# Patient Record
Sex: MALE | Race: OTHER | HISPANIC OR LATINO
[De-identification: names, ages, dates, MRNs, and addresses within clinical notes are randomized per-mention and may not be internally consistent; named-entity substitution may affect disease eponyms.]

---

## 2019-10-10 ENCOUNTER — APPOINTMENT (OUTPATIENT)
Dept: INTERNAL MEDICINE | Facility: CLINIC | Age: 69
End: 2019-10-10

## 2020-08-20 ENCOUNTER — APPOINTMENT (OUTPATIENT)
Dept: INTERNAL MEDICINE | Facility: CLINIC | Age: 70
End: 2020-08-20

## 2020-12-24 ENCOUNTER — TRANSCRIPTION ENCOUNTER (OUTPATIENT)
Age: 70
End: 2020-12-24

## 2021-02-26 ENCOUNTER — APPOINTMENT (OUTPATIENT)
Dept: INTERNAL MEDICINE | Facility: CLINIC | Age: 71
End: 2021-02-26
Payer: MEDICARE

## 2021-02-26 VITALS
SYSTOLIC BLOOD PRESSURE: 130 MMHG | OXYGEN SATURATION: 97 % | HEART RATE: 74 BPM | DIASTOLIC BLOOD PRESSURE: 70 MMHG | BODY MASS INDEX: 22.65 KG/M2 | HEIGHT: 67.5 IN | WEIGHT: 146 LBS

## 2021-02-26 DIAGNOSIS — Z78.9 OTHER SPECIFIED HEALTH STATUS: ICD-10-CM

## 2021-02-26 DIAGNOSIS — Z86.018 PERSONAL HISTORY OF OTHER BENIGN NEOPLASM: ICD-10-CM

## 2021-02-26 DIAGNOSIS — Z84.89 FAMILY HISTORY OF OTHER SPECIFIED CONDITIONS: ICD-10-CM

## 2021-02-26 DIAGNOSIS — Z80.3 FAMILY HISTORY OF MALIGNANT NEOPLASM OF BREAST: ICD-10-CM

## 2021-02-26 DIAGNOSIS — Z85.038 PERSONAL HISTORY OF OTHER MALIGNANT NEOPLASM OF LARGE INTESTINE: ICD-10-CM

## 2021-02-26 DIAGNOSIS — M79.603 PAIN IN ARM, UNSPECIFIED: ICD-10-CM

## 2021-02-26 DIAGNOSIS — U07.1 COVID-19: ICD-10-CM

## 2021-02-26 DIAGNOSIS — Z92.3 PERSONAL HISTORY OF IRRADIATION: ICD-10-CM

## 2021-02-26 DIAGNOSIS — Z92.21 PERSONAL HISTORY OF ANTINEOPLASTIC CHEMOTHERAPY: ICD-10-CM

## 2021-02-26 PROCEDURE — G0442 ANNUAL ALCOHOL SCREEN 15 MIN: CPT | Mod: 59

## 2021-02-26 PROCEDURE — G0439: CPT | Mod: CS

## 2021-02-26 PROCEDURE — G0444 DEPRESSION SCREEN ANNUAL: CPT | Mod: 59

## 2021-02-26 NOTE — PHYSICAL EXAM
[No Lymphadenopathy] : no lymphadenopathy [Supple] : supple [No Varicosities] : no varicosities [No Edema] : there was no peripheral edema [Soft] : abdomen soft [Non Tender] : non-tender [Non-distended] : non-distended [Normal Supraclavicular Nodes] : no supraclavicular lymphadenopathy [Coordination Grossly Intact] : coordination grossly intact [No Focal Deficits] : no focal deficits [Normal Gait] : normal gait [Normal] : affect was normal and insight and judgment were intact [de-identified] : poor dentition, evidence of grinding teeth [de-identified] : declined [de-identified] : b/l knee effusions. ulnar deviation of b/l hands [de-identified] : erythematous, smooth non scaly plaques on legs. dactylitis of both hands

## 2021-02-26 NOTE — ASSESSMENT
[FreeTextEntry1] : 70M with history of COVID infection, history of tonsil cancer, tubulovillous adenoma on colonoscopy presents for new patient appointment. \par \par 1. HCM\par -vaccinations: influenza , shingrix, pneumovax, tdap are due but since patient finished COVID dose Tuesday will delay vaccination for now; need to wait 2 weeks. they prefer to discuss next visit\par -colon cancer screening - last exam in 2019 was normal; following at [facility] [place] will bring records next visit\par -dexa - referral given today\par -hcv/hiv testing offered\par -pt would like PSA checked.  Patient wanted prostate checked-explained that in primary care clinic we can do prostate exam and PSA.  Patient and his daughter asked about accuracy of both–explained that prostate exam is not perfect as only 1 part of the prostate can be examined so any masses that might be located on the other side would be palpated.  Similarly PSA is also not a perfect test elevations can be present when there is prostate cancer present or possibly if there is just BPH.  With this information patient elected to just have PSA checked\par \par 2. Colon tubulovillous adenoma\par -GI follow up - referral given in event they need for insurance purposes\par \par 3. History of tonsil cancer\par -cancer free since 2018, following at [facility] [place]\par \par 4. Ulnar deviation; psoriatic lesions?\par -rheumatology referral\par -non scaly plaques not exactly c/w psoriasis\par -fingers thickened/dactylitis with ulnar deviation\par \par 5.  Advanced care planning\par -Patient has not had a discussion about what he would like his care to look like should his heart stop or should he be unable to breathe but they are going to have this discussion\par -Patient would like his daughter to be his healthcare proxy\par -Provided with [facility] healthcare proxy forms which they will bring to next appointment\par \par RTC 4 months\par

## 2021-02-26 NOTE — HISTORY OF PRESENT ILLNESS
Psych/Behavioral [Family Member] : family member [FreeTextEntry1] : Visit to establish care with new primary care doctor\par  [de-identified] : 70M with history of COVID infection, history of tonsil cancer, tubulovillous adenoma on colonoscopy presents for new patient appointment. He is accompanied by his daughter, Barbara. Patient had PMD many years ago and in 2012 he had colon cancer. Then he had throat cancer. They have been to [facility] [place] [facility] and he needs PMD. His daughter is concerned about a numbness in his right arm, it falls asleep and feels electric pains in it. Mostly after he had vaccine he felt it more. They are referring to COVID vaccine. He has arthritis and is undergoing workup for rheumatoid arthritis. They also want prostate exam. Cancer free since 2018. Colon cancer was treated with partial colectomy then had more polyps then full colectomy. For throat cancer had chemo and radiation. He also has knee pain that limits his movement. \par \par HSS knee replacement 3/31/2021. PT planned for after \par \par Arm pain - started 1 month ago. The vaccine made it worse. He is constantly stretching the pain and when he feels it moves his arm and it goes away. No neck pain. Does not have the pain now.\par \par Prostate concerns - just has been a long time since evaluation. Does not have a strong stream anymore. \par \par PMH: colon cancer, head and neck cancer\par PSH: colectomy\par FH: mother had ICH. father passed away age 104, natural causes.  sister had breast cancer. \par SH: semi retired. lives with wife, DORON, 2 of brothers and sisters. \par Medications: no medications. vitamin c and d, zinc, asa daily\par Allergies: shrimp\par \par Reference #: [id number] - no prescriptions

## 2021-02-26 NOTE — REVIEW OF SYSTEMS
[Hearing Loss] : hearing loss [Hesitancy] : hesitancy [Skin Rash] : skin rash [Negative] : Heme/Lymph [Fever] : no fever [Chills] : no chills [Vision Problems] : no vision problems [Chest Pain] : no chest pain [Shortness Of Breath] : no shortness of breath [Abdominal Pain] : no abdominal pain [Dysuria] : no dysuria [Suicidal] : not suicidal [de-identified] : psoriasis

## 2021-02-26 NOTE — HEALTH RISK ASSESSMENT
[Good] : ~his/her~  mood as  good [1 or 2 (0 pts)] : 1 or 2 (0 points) [Never (0 pts)] : Never (0 points) [No] : In the past 12 months have you used drugs other than those required for medical reasons? No [0] : 2) Feeling down, depressed, or hopeless: Not at all (0) [With Family] : lives with family [Retired] : retired [] :  [Feels Safe at Home] : Feels safe at home [Fully functional (bathing, dressing, toileting, transferring, walking, feeding)] : Fully functional (bathing, dressing, toileting, transferring, walking, feeding) [Fully functional (using the telephone, shopping, preparing meals, housekeeping, doing laundry, using] : Fully functional and needs no help or supervision to perform IADLs (using the telephone, shopping, preparing meals, housekeeping, doing laundry, using transportation, managing medications and managing finances) [Smoke Detector] : smoke detector [Carbon Monoxide Detector] : carbon monoxide detector [Seat Belt] :  uses seat belt [Designated Healthcare Proxy] : Designated healthcare proxy [Name: ___] : Health Care Proxy's Name: [unfilled]  [Relationship: ___] : Relationship: [unfilled] [] : No [de-identified] : before pandemic was drinking 2-3 glasses wine daily.  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  [UNA1Rahqo] : 0 [Reports changes in hearing] : Reports no changes in hearing [Reports changes in vision] : Reports no changes in vision [Reports changes in dental health] : Reports no changes in dental health [Guns at Home] : no guns at home [Sunscreen] : does not use sunscreen [ColonoscopyDate] : 10/20/2017 [de-identified] : since radiation has hearing loss [de-identified] : educated on sunscreen use [AdvancecareDate] : 02/26/2021

## 2021-03-01 LAB
ALBUMIN SERPL ELPH-MCNC: 4.5 G/DL
ALP BLD-CCNC: 68 U/L
ALT SERPL-CCNC: 13 U/L
ANA SER IF-ACNC: NEGATIVE
ANION GAP SERPL CALC-SCNC: 17 MMOL/L
AST SERPL-CCNC: 17 U/L
BASOPHILS # BLD AUTO: 0.03 K/UL
BASOPHILS NFR BLD AUTO: 0.6 %
BILIRUB SERPL-MCNC: 0.2 MG/DL
BUN SERPL-MCNC: 25 MG/DL
CALCIUM SERPL-MCNC: 9.9 MG/DL
CCP AB SER IA-ACNC: <8 UNITS
CHLORIDE SERPL-SCNC: 99 MMOL/L
CHOLEST SERPL-MCNC: 220 MG/DL
CO2 SERPL-SCNC: 21 MMOL/L
CREAT SERPL-MCNC: 1.13 MG/DL
EOSINOPHIL # BLD AUTO: 0.18 K/UL
EOSINOPHIL NFR BLD AUTO: 3.5 %
ESTIMATED AVERAGE GLUCOSE: 105 MG/DL
GLUCOSE SERPL-MCNC: 90 MG/DL
HBA1C MFR BLD HPLC: 5.3 %
HCT VFR BLD CALC: 38.9 %
HCV AB SER QL: NONREACTIVE
HCV S/CO RATIO: 0.21 S/CO
HDLC SERPL-MCNC: 66 MG/DL
HGB BLD-MCNC: 12.9 G/DL
HIV1+2 AB SPEC QL IA.RAPID: NONREACTIVE
IMM GRANULOCYTES NFR BLD AUTO: 0.2 %
LDLC SERPL CALC-MCNC: 129 MG/DL
LYMPHOCYTES # BLD AUTO: 1.24 K/UL
LYMPHOCYTES NFR BLD AUTO: 24.1 %
MAN DIFF?: NORMAL
MCHC RBC-ENTMCNC: 32.3 PG
MCHC RBC-ENTMCNC: 33.2 GM/DL
MCV RBC AUTO: 97.5 FL
MONOCYTES # BLD AUTO: 0.8 K/UL
MONOCYTES NFR BLD AUTO: 15.6 %
NEUTROPHILS # BLD AUTO: 2.88 K/UL
NEUTROPHILS NFR BLD AUTO: 56 %
NONHDLC SERPL-MCNC: 154 MG/DL
PLATELET # BLD AUTO: 283 K/UL
POTASSIUM SERPL-SCNC: 4.5 MMOL/L
PROT SERPL-MCNC: 7.5 G/DL
PSA SERPL-MCNC: 0.92 NG/ML
RBC # BLD: 3.99 M/UL
RBC # FLD: 12.3 %
RF+CCP IGG SER-IMP: NEGATIVE
RHEUMATOID FACT SER QL: 14 IU/ML
SODIUM SERPL-SCNC: 137 MMOL/L
TRIGL SERPL-MCNC: 124 MG/DL
WBC # FLD AUTO: 5.14 K/UL

## 2021-04-01 ENCOUNTER — NON-APPOINTMENT (OUTPATIENT)
Age: 71
End: 2021-04-01

## 2021-04-23 ENCOUNTER — APPOINTMENT (OUTPATIENT)
Dept: INTERNAL MEDICINE | Facility: CLINIC | Age: 71
End: 2021-04-23

## 2021-06-03 ENCOUNTER — TRANSCRIPTION ENCOUNTER (OUTPATIENT)
Age: 71
End: 2021-06-03

## 2021-06-11 ENCOUNTER — NON-APPOINTMENT (OUTPATIENT)
Age: 71
End: 2021-06-11

## 2021-06-14 ENCOUNTER — NON-APPOINTMENT (OUTPATIENT)
Age: 71
End: 2021-06-14

## 2021-06-14 ENCOUNTER — APPOINTMENT (OUTPATIENT)
Dept: INTERNAL MEDICINE | Facility: CLINIC | Age: 71
End: 2021-06-14

## 2021-07-30 ENCOUNTER — NON-APPOINTMENT (OUTPATIENT)
Age: 71
End: 2021-07-30

## 2022-05-12 ENCOUNTER — NON-APPOINTMENT (OUTPATIENT)
Age: 72
End: 2022-05-12

## 2022-05-12 ENCOUNTER — APPOINTMENT (OUTPATIENT)
Dept: OPHTHALMOLOGY | Facility: CLINIC | Age: 72
End: 2022-05-12
Payer: MEDICARE

## 2022-05-12 PROCEDURE — 92250 FUNDUS PHOTOGRAPHY W/I&R: CPT

## 2022-05-12 PROCEDURE — 92004 COMPRE OPH EXAM NEW PT 1/>: CPT

## 2022-05-12 PROCEDURE — 92025 CPTRIZED CORNEAL TOPOGRAPHY: CPT

## 2022-06-22 ENCOUNTER — APPOINTMENT (OUTPATIENT)
Dept: OPHTHALMOLOGY | Facility: CLINIC | Age: 72
End: 2022-06-22
Payer: MEDICARE

## 2022-06-22 ENCOUNTER — NON-APPOINTMENT (OUTPATIENT)
Age: 72
End: 2022-06-22

## 2022-06-22 PROCEDURE — 92133 CPTRZD OPH DX IMG PST SGM ON: CPT

## 2022-06-22 PROCEDURE — 66821 AFTER CATARACT LASER SURGERY: CPT | Mod: RT

## 2022-07-28 ENCOUNTER — NON-APPOINTMENT (OUTPATIENT)
Age: 72
End: 2022-07-28

## 2022-07-28 ENCOUNTER — APPOINTMENT (OUTPATIENT)
Dept: OPHTHALMOLOGY | Facility: CLINIC | Age: 72
End: 2022-07-28

## 2022-07-28 PROCEDURE — 66821 AFTER CATARACT LASER SURGERY: CPT | Mod: 79,LT

## 2022-11-01 ENCOUNTER — APPOINTMENT (OUTPATIENT)
Dept: OPHTHALMOLOGY | Facility: CLINIC | Age: 72
End: 2022-11-01

## 2022-11-01 ENCOUNTER — NON-APPOINTMENT (OUTPATIENT)
Age: 72
End: 2022-11-01

## 2022-11-01 PROCEDURE — 92014 COMPRE OPH EXAM EST PT 1/>: CPT

## 2023-03-27 ENCOUNTER — LABORATORY RESULT (OUTPATIENT)
Age: 73
End: 2023-03-27

## 2023-03-27 ENCOUNTER — APPOINTMENT (OUTPATIENT)
Dept: INTERNAL MEDICINE | Facility: CLINIC | Age: 73
End: 2023-03-27
Payer: MEDICARE

## 2023-03-27 VITALS
WEIGHT: 150 LBS | DIASTOLIC BLOOD PRESSURE: 84 MMHG | OXYGEN SATURATION: 98 % | BODY MASS INDEX: 23.27 KG/M2 | HEIGHT: 67.5 IN | SYSTOLIC BLOOD PRESSURE: 132 MMHG | HEART RATE: 73 BPM

## 2023-03-27 DIAGNOSIS — R21 RASH AND OTHER NONSPECIFIC SKIN ERUPTION: ICD-10-CM

## 2023-03-27 DIAGNOSIS — H91.90 UNSPECIFIED HEARING LOSS, UNSPECIFIED EAR: ICD-10-CM

## 2023-03-27 DIAGNOSIS — R39.15 URGENCY OF URINATION: ICD-10-CM

## 2023-03-27 DIAGNOSIS — R41.3 OTHER AMNESIA: ICD-10-CM

## 2023-03-27 DIAGNOSIS — M20.61 ACQUIRED DEFORMITIES OF TOE(S), UNSPECIFIED, RIGHT FOOT: ICD-10-CM

## 2023-03-27 PROCEDURE — 99212 OFFICE O/P EST SF 10 MIN: CPT | Mod: 25

## 2023-03-27 PROCEDURE — G0439: CPT

## 2023-03-27 NOTE — HEALTH RISK ASSESSMENT
[With Family] : lives with family [Employed] : employed [Retired] : retired [] :  [Feels Safe at Home] : Feels safe at home [Fully functional (bathing, dressing, toileting, transferring, walking, feeding)] : Fully functional (bathing, dressing, toileting, transferring, walking, feeding) [Fully functional (using the telephone, shopping, preparing meals, housekeeping, doing laundry, using] : Fully functional and needs no help or supervision to perform IADLs (using the telephone, shopping, preparing meals, housekeeping, doing laundry, using transportation, managing medications and managing finances) [Smoke Detector] : smoke detector [Carbon Monoxide Detector] : carbon monoxide detector [Seat Belt] :  uses seat belt [Good] : ~his/her~  mood as  good [0] : 2) Feeling down, depressed, or hopeless: Not at all (0) [PHQ-2 Negative - No further assessment needed] : PHQ-2 Negative - No further assessment needed [Never] : Never [de-identified] : active when in [place] but less active in states [de-identified] : eating healty meals- sometimes salami which is higher in salt [[id number]] : 0 [Reports changes in hearing] : Reports no changes in hearing [Reports changes in vision] : Reports no changes in vision [Reports changes in dental health] : Reports no changes in dental health [Guns at Home] : no guns at home [Sunscreen] : does not use sunscreen [ColonoscopyDate] : 10/20/2018 [de-identified] : wife, MIL, 2 of brothers and sisters. [de-identified] : since radiation has hearing loss [de-identified] : educated on sunscreen use

## 2023-03-27 NOTE — ASSESSMENT
[FreeTextEntry1] : 72M with history of COVID infection, history of tonsil cancer, tubulovillous adenoma on colonoscopy presents for CPE\par \par 1. HCM\par -vaccinations: influenza , shingrix, pneumovax, tdap - will check with [facility] [place] and obtain in pharmacy if not received yet. he is utd on flu vaccine and covid vaccine\par -colon cancer screening - last exam in 2019 was normal; [place] [facility] [place] visit\par PSA neg 02/2021; normal exam today. urology referral for urinary urgency. \par \par 2. Colon tubulovillous adenoma\par -following at [facility]\par -they do not know when his last colonoscopy was. \par \par 3. History of tonsil cancer\par -cancer free since 2018, following at [facility]\par \par 4. Ulnar deviation; psoriatic lesions\par -rheumatology referral, derm referral requested by pts daughter. RF positive in the past. still has large plaque on back. no silvery scale, not sure if exactly c/w psoriasis. try triamcinolone and f/u with dermatology \par -fingers thickened/dactylitis with ulnar deviation\par \par 5. Hearing loss - requested ENT referral; ordered today\par \par 6. Broken tooth on exam; recommended dental f/u\par \par 7. Memory loss\par -labs as per plan\par -requested neurology referra;; ordered today\par \par

## 2023-03-27 NOTE — HISTORY OF PRESENT ILLNESS
[Family Member] : family member [FreeTextEntry1] : Patient presents today for annual physical exam\par  [de-identified] : Decreased hearing, had eye surgery last year \par Wasn't able to see rheumatology last year as his wife was having medical issues.

## 2023-03-27 NOTE — PHYSICAL EXAM
[No Acute Distress] : no acute distress [Well Nourished] : well nourished [Well Developed] : well developed [Well-Appearing] : well-appearing [Normal Sclera/Conjunctiva] : normal sclera/conjunctiva [PERRL] : pupils equal round and reactive to light [EOMI] : extraocular movements intact [Normal Outer Ear/Nose] : the outer ears and nose were normal in appearance [Normal Oropharynx] : the oropharynx was normal [Normal TMs] : both tympanic membranes were normal [No JVD] : no jugular venous distention [No Lymphadenopathy] : no lymphadenopathy [Supple] : supple [Thyroid Normal, No Nodules] : the thyroid was normal and there were no nodules present [No Respiratory Distress] : no respiratory distress  [No Accessory Muscle Use] : no accessory muscle use [Clear to Auscultation] : lungs were clear to auscultation bilaterally [Normal Rate] : normal rate  [Regular Rhythm] : with a regular rhythm [Normal S1, S2] : normal S1 and S2 [No Murmur] : no murmur heard [No Varicosities] : no varicosities [Pedal Pulses Present] : the pedal pulses are present [No Edema] : there was no peripheral edema [No Palpable Aorta] : no palpable aorta [No Extremity Clubbing/Cyanosis] : no extremity clubbing/cyanosis [Soft] : abdomen soft [Non Tender] : non-tender [Non-distended] : non-distended [No Masses] : no abdominal mass palpated [No HSM] : no HSM [Normal Sphincter Tone] : normal sphincter tone [No Mass] : no mass [Normal Supraclavicular Nodes] : no supraclavicular lymphadenopathy [Normal Posterior Cervical Nodes] : no posterior cervical lymphadenopathy [Normal Anterior Cervical Nodes] : no anterior cervical lymphadenopathy [No CVA Tenderness] : no CVA  tenderness [No Spinal Tenderness] : no spinal tenderness [No Joint Swelling] : no joint swelling [Grossly Normal Strength/Tone] : grossly normal strength/tone [No Rash] : no rash [Coordination Grossly Intact] : coordination grossly intact [No Focal Deficits] : no focal deficits [Normal Gait] : normal gait [Normal Affect] : the affect was normal [Normal Insight/Judgement] : insight and judgment were intact [FreeTextEntry1] : no prostate lesion palpated

## 2023-03-28 ENCOUNTER — TRANSCRIPTION ENCOUNTER (OUTPATIENT)
Age: 73
End: 2023-03-28

## 2023-03-28 DIAGNOSIS — R79.89 OTHER SPECIFIED ABNORMAL FINDINGS OF BLOOD CHEMISTRY: ICD-10-CM

## 2023-03-28 LAB
ALBUMIN SERPL ELPH-MCNC: 4.6 G/DL
ALP BLD-CCNC: 63 U/L
ALT SERPL-CCNC: 10 U/L
ANION GAP SERPL CALC-SCNC: 13 MMOL/L
AST SERPL-CCNC: 21 U/L
B BURGDOR AB SER-IMP: NEGATIVE
B BURGDOR IGG+IGM SER QL: 0.17 INDEX
BASOPHILS # BLD AUTO: 0.03 K/UL
BASOPHILS NFR BLD AUTO: 0.7 %
BILIRUB SERPL-MCNC: 0.3 MG/DL
BUN SERPL-MCNC: 17 MG/DL
CALCIUM SERPL-MCNC: 10.1 MG/DL
CHLORIDE SERPL-SCNC: 100 MMOL/L
CHOLEST SERPL-MCNC: 227 MG/DL
CO2 SERPL-SCNC: 24 MMOL/L
CREAT SERPL-MCNC: 1.16 MG/DL
EGFR: 66 ML/MIN/1.73M2
EOSINOPHIL # BLD AUTO: 0.18 K/UL
EOSINOPHIL NFR BLD AUTO: 4.2 %
ESTIMATED AVERAGE GLUCOSE: 103 MG/DL
FOLATE SERPL-MCNC: 6.5 NG/ML
GLUCOSE SERPL-MCNC: 82 MG/DL
HBA1C MFR BLD HPLC: 5.2 %
HCT VFR BLD CALC: 45 %
HDLC SERPL-MCNC: 107 MG/DL
HGB BLD-MCNC: 15 G/DL
HIV1+2 AB SPEC QL IA.RAPID: NONREACTIVE
IMM GRANULOCYTES NFR BLD AUTO: 0.9 %
LDLC SERPL CALC-MCNC: 104 MG/DL
LYMPHOCYTES # BLD AUTO: 1.3 K/UL
LYMPHOCYTES NFR BLD AUTO: 30.2 %
MAN DIFF?: NORMAL
MCHC RBC-ENTMCNC: 33.2 PG
MCHC RBC-ENTMCNC: 33.3 GM/DL
MCV RBC AUTO: 99.6 FL
MONOCYTES # BLD AUTO: 0.51 K/UL
MONOCYTES NFR BLD AUTO: 11.8 %
NEUTROPHILS # BLD AUTO: 2.25 K/UL
NEUTROPHILS NFR BLD AUTO: 52.2 %
NONHDLC SERPL-MCNC: 120 MG/DL
PLATELET # BLD AUTO: 322 K/UL
POTASSIUM SERPL-SCNC: 4.5 MMOL/L
PROT SERPL-MCNC: 7.6 G/DL
PSA SERPL-MCNC: 3.18 NG/ML
RBC # BLD: 4.52 M/UL
RBC # FLD: 12.2 %
SODIUM SERPL-SCNC: 137 MMOL/L
T PALLIDUM AB SER QL IA: NEGATIVE
TRIGL SERPL-MCNC: 83 MG/DL
TSH SERPL-ACNC: 31 UIU/ML
VIT B12 SERPL-MCNC: 452 PG/ML
WBC # FLD AUTO: 4.31 K/UL

## 2023-03-29 ENCOUNTER — TRANSCRIPTION ENCOUNTER (OUTPATIENT)
Age: 73
End: 2023-03-29

## 2023-03-29 LAB
T3FREE SERPL-MCNC: 2.03 PG/ML
THYROGLOB AB SERPL-ACNC: <20 IU/ML
THYROPEROXIDASE AB SERPL IA-ACNC: 11.9 IU/ML

## 2023-03-29 NOTE — PHYSICAL EXAM
[General Appearance - Well Developed] : well developed [Normal Appearance] : normal appearance [] : no respiratory distress [Oriented To Time, Place, And Person] : oriented to person, place, and time [Affect] : the affect was normal

## 2023-03-29 NOTE — REVIEW OF SYSTEMS
[see HPI] : see HPI [Joint Pain] : joint pain [Joint Stiffness] : joint stiffness [Skin Lesions] : skin lesion [Itching] : itching [Negative] : Gastrointestinal

## 2023-03-30 ENCOUNTER — APPOINTMENT (OUTPATIENT)
Dept: UROLOGY | Facility: CLINIC | Age: 73
End: 2023-03-30
Payer: MEDICARE

## 2023-03-30 PROCEDURE — 99204 OFFICE O/P NEW MOD 45 MIN: CPT | Mod: 95

## 2023-03-30 NOTE — HISTORY OF PRESENT ILLNESS
[Other Location: e.g. School (Enter Location, City,State)___] : at [unfilled], at the time of the visit. [Medical Office: ([facility])___] : at the medical office located in  [Family Member] : family member [Verbal consent obtained from patient] : the patient, [unfilled] [FreeTextEntry1] : Dear Dr. Dr Nava\par \par Thank you for so much for the referral to help care for your patient.\par \par Chief Complaint Elevated PSA\par Date of first visit: 3/30/2023\par \par TAMI RUSSO  is a 72 year old gentleman with a PMHx of rheumatoid arthritis, colon & throat cancer who presents today for an elevated PSA. His PSA is 3.18ng/mL, drawn on 3/27/23. His historical PSA results are detailed below. He has not had a pelvic MRI nor a prostate biopsy. He denies any family history of  malignancies. \par \par He reports urinary urgency with occasional dribbling. Patient denies weak stream, straining, urinary frequency, incontinence, nocturia, dysuria, hematuria, hesitancy, or intermittency. \par \par PSA Hx\par 3.18 ng/ml on 3/27/2023\par 0.92 ng/ml on 2/26/2021\par \par MRI Hx\par N/A\par \par Biopsy Hx\par N/A\par \par 03/30/2023\par IPSS 5 QOL 3\par ED Function Score: 19/30 \par Orgasmic Function Score: 6/10 \par Sexual Desire Score: 5/10\par [place] Satisfaction: 11/15\par Overall Satisfaction: 2/10\par \par The patient denies fevers, chills, nausea and or vomiting and no unexplained weight loss.\par \par All pertinent laboratory, films and physician notes were reviewed.  Questionnaire results were discussed with patient.\par

## 2023-05-16 ENCOUNTER — APPOINTMENT (OUTPATIENT)
Dept: OPHTHALMOLOGY | Facility: CLINIC | Age: 73
End: 2023-05-16

## 2023-08-04 ENCOUNTER — NON-APPOINTMENT (OUTPATIENT)
Age: 73
End: 2023-08-04

## 2023-08-04 LAB
PSA FREE FLD-MCNC: 32 %
PSA FREE SERPL-MCNC: 0.39 NG/ML
PSA SERPL-MCNC: 1.22 NG/ML

## 2023-10-01 PROBLEM — Z92.3 HISTORY OF RADIATION THERAPY: Status: ACTIVE | Noted: 2021-02-26

## 2024-04-07 ENCOUNTER — NON-APPOINTMENT (OUTPATIENT)
Age: 74
End: 2024-04-07

## 2024-05-24 ENCOUNTER — APPOINTMENT (OUTPATIENT)
Dept: INTERNAL MEDICINE | Facility: CLINIC | Age: 74
End: 2024-05-24
Payer: MEDICARE

## 2024-05-24 ENCOUNTER — OUTPATIENT (OUTPATIENT)
Dept: OUTPATIENT SERVICES | Facility: HOSPITAL | Age: 74
LOS: 1 days | End: 2024-05-24
Payer: MEDICARE

## 2024-05-24 VITALS
SYSTOLIC BLOOD PRESSURE: 102 MMHG | DIASTOLIC BLOOD PRESSURE: 60 MMHG | WEIGHT: 135 LBS | OXYGEN SATURATION: 96 % | HEART RATE: 83 BPM | HEIGHT: 67.5 IN | BODY MASS INDEX: 20.94 KG/M2

## 2024-05-24 DIAGNOSIS — K21.9 GASTRO-ESOPHAGEAL REFLUX DISEASE W/OUT ESOPHAGITIS: ICD-10-CM

## 2024-05-24 DIAGNOSIS — M20.41 OTHER HAMMER TOE(S) (ACQUIRED), RIGHT FOOT: ICD-10-CM

## 2024-05-24 DIAGNOSIS — R73.03 PREDIABETES.: ICD-10-CM

## 2024-05-24 DIAGNOSIS — Z00.00 ENCOUNTER FOR GENERAL ADULT MEDICAL EXAMINATION WITHOUT ABNORMAL FINDINGS: ICD-10-CM

## 2024-05-24 DIAGNOSIS — B35.1 TINEA UNGUIUM: ICD-10-CM

## 2024-05-24 DIAGNOSIS — Z13.31 ENCOUNTER FOR SCREENING FOR DEPRESSION: ICD-10-CM

## 2024-05-24 DIAGNOSIS — K21.9 GASTRO-ESOPHAGEAL REFLUX DISEASE WITHOUT ESOPHAGITIS: ICD-10-CM

## 2024-05-24 DIAGNOSIS — M06.9 RHEUMATOID ARTHRITIS, UNSPECIFIED: ICD-10-CM

## 2024-05-24 DIAGNOSIS — M21.611 BUNION OF RIGHT FOOT: ICD-10-CM

## 2024-05-24 DIAGNOSIS — R97.20 ELEVATED PROSTATE SPECIFIC ANTIGEN [PSA]: ICD-10-CM

## 2024-05-24 DIAGNOSIS — R73.03 PREDIABETES: ICD-10-CM

## 2024-05-24 DIAGNOSIS — Z00.00 ENCOUNTER FOR GENERAL ADULT MEDICAL EXAMINATION W/OUT ABNORMAL FINDINGS: ICD-10-CM

## 2024-05-24 DIAGNOSIS — E87.1 HYPO-OSMOLALITY AND HYPONATREMIA: ICD-10-CM

## 2024-05-24 DIAGNOSIS — L40.50 ARTHROPATHIC PSORIASIS, UNSPECIFIED: ICD-10-CM

## 2024-05-24 DIAGNOSIS — R97.20 ELEVATED PROSTATE, SPECIFIC ANTIGEN [PSA]: ICD-10-CM

## 2024-05-24 DIAGNOSIS — I10 ESSENTIAL (PRIMARY) HYPERTENSION: ICD-10-CM

## 2024-05-24 DIAGNOSIS — C03.9 MALIGNANT NEOPLASM OF GUM, UNSPECIFIED: ICD-10-CM

## 2024-05-24 DIAGNOSIS — M20.40 OTHER HAMMER TOE(S) (ACQUIRED), UNSPECIFIED FOOT: ICD-10-CM

## 2024-05-24 DIAGNOSIS — Z13.39 ENCOUNTER FOR SCREENING EXAM FOR OTHER MENTAL HEALTH AND BEHAVIORAL DISORDERS: ICD-10-CM

## 2024-05-24 DIAGNOSIS — Z13.39 ENCOUNTER FOR SCREENING EXAMINATION FOR OTHER MENTAL HEALTH AND BEHAVIORAL DISORDERS: ICD-10-CM

## 2024-05-24 PROCEDURE — G0444 DEPRESSION SCREEN ANNUAL: CPT | Mod: 59

## 2024-05-24 PROCEDURE — G0442 ANNUAL ALCOHOL SCREEN 15 MIN: CPT

## 2024-05-24 PROCEDURE — G0439: CPT

## 2024-05-25 ENCOUNTER — TRANSCRIPTION ENCOUNTER (OUTPATIENT)
Age: 74
End: 2024-05-25

## 2024-05-25 PROBLEM — R73.03 PREDIABETES: Status: ACTIVE | Noted: 2024-05-25

## 2024-05-25 PROBLEM — E87.1 HYPONATREMIA: Status: ACTIVE | Noted: 2024-05-25

## 2024-05-25 LAB
ALBUMIN SERPL ELPH-MCNC: 4.2 G/DL
ALP BLD-CCNC: 60 U/L
ALT SERPL-CCNC: 17 U/L
ANION GAP SERPL CALC-SCNC: 13 MMOL/L
AST SERPL-CCNC: 20 U/L
BASOPHILS # BLD AUTO: 0.03 K/UL
BASOPHILS NFR BLD AUTO: 0.4 %
BILIRUB SERPL-MCNC: 0.3 MG/DL
BUN SERPL-MCNC: 16 MG/DL
CALCIUM SERPL-MCNC: 9.7 MG/DL
CHLORIDE SERPL-SCNC: 95 MMOL/L
CHOLEST SERPL-MCNC: 189 MG/DL
CO2 SERPL-SCNC: 26 MMOL/L
CREAT SERPL-MCNC: 1.28 MG/DL
CREAT SPEC-SCNC: 77 MG/DL
EGFR: 58 ML/MIN/1.73M2
EOSINOPHIL # BLD AUTO: 0.32 K/UL
EOSINOPHIL NFR BLD AUTO: 3.9 %
ESTIMATED AVERAGE GLUCOSE: 120 MG/DL
GLUCOSE SERPL-MCNC: 93 MG/DL
HBA1C MFR BLD HPLC: 5.8 %
HCT VFR BLD CALC: 37.9 %
HDLC SERPL-MCNC: 48 MG/DL
HGB BLD-MCNC: 12.3 G/DL
IMM GRANULOCYTES NFR BLD AUTO: 0.5 %
LDLC SERPL CALC-MCNC: 127 MG/DL
LYMPHOCYTES # BLD AUTO: 1.58 K/UL
LYMPHOCYTES NFR BLD AUTO: 19.4 %
MAN DIFF?: NORMAL
MCHC RBC-ENTMCNC: 29.9 PG
MCHC RBC-ENTMCNC: 32.5 GM/DL
MCV RBC AUTO: 92.2 FL
MICROALBUMIN 24H UR DL<=1MG/L-MCNC: <1.2 MG/DL
MICROALBUMIN/CREAT 24H UR-RTO: NORMAL MG/G
MONOCYTES # BLD AUTO: 0.98 K/UL
MONOCYTES NFR BLD AUTO: 12 %
NEUTROPHILS # BLD AUTO: 5.21 K/UL
NEUTROPHILS NFR BLD AUTO: 63.8 %
NONHDLC SERPL-MCNC: 140 MG/DL
PLATELET # BLD AUTO: 426 K/UL
POTASSIUM SERPL-SCNC: 5.1 MMOL/L
PROT SERPL-MCNC: 7.9 G/DL
PSA SERPL-MCNC: 1.36 NG/ML
RBC # BLD: 4.11 M/UL
RBC # FLD: 12 %
SODIUM SERPL-SCNC: 134 MMOL/L
T4 FREE SERPL-MCNC: 1.3 NG/DL
TRIGL SERPL-MCNC: 73 MG/DL
TSH SERPL-ACNC: 4.91 UIU/ML
WBC # FLD AUTO: 8.16 K/UL

## 2024-05-25 RX ORDER — LEVOTHYROXINE SODIUM 0.09 MG/1
88 TABLET ORAL DAILY
Qty: 90 | Refills: 0 | Status: ACTIVE | COMMUNITY
Start: 2023-03-28

## 2024-05-25 RX ORDER — TRIAMCINOLONE ACETONIDE 0.25 MG/G
0.03 OINTMENT TOPICAL 3 TIMES DAILY
Qty: 1 | Refills: 3 | Status: DISCONTINUED | COMMUNITY
Start: 2023-03-27 | End: 2024-05-25

## 2024-05-25 RX ORDER — B-COMPLEX WITH VITAMIN C
100 TABLET ORAL
Refills: 0 | Status: DISCONTINUED | COMMUNITY
End: 2024-05-25

## 2024-05-25 RX ORDER — CHROMIUM 200 MCG
TABLET ORAL
Refills: 0 | Status: DISCONTINUED | COMMUNITY
End: 2024-05-25

## 2024-05-25 RX ORDER — ASCORBIC ACID 500 MG
TABLET ORAL
Refills: 0 | Status: DISCONTINUED | COMMUNITY
End: 2024-05-25

## 2024-06-11 ENCOUNTER — APPOINTMENT (OUTPATIENT)
Dept: INTERNAL MEDICINE | Facility: CLINIC | Age: 74
End: 2024-06-11
Payer: MEDICARE

## 2024-06-11 ENCOUNTER — OUTPATIENT (OUTPATIENT)
Dept: OUTPATIENT SERVICES | Facility: HOSPITAL | Age: 74
LOS: 1 days | End: 2024-06-11
Payer: MEDICARE

## 2024-06-11 VITALS
WEIGHT: 135 LBS | BODY MASS INDEX: 20.94 KG/M2 | TEMPERATURE: 97.6 F | HEART RATE: 85 BPM | DIASTOLIC BLOOD PRESSURE: 60 MMHG | OXYGEN SATURATION: 98 % | SYSTOLIC BLOOD PRESSURE: 86 MMHG | HEIGHT: 67.5 IN

## 2024-06-11 VITALS — DIASTOLIC BLOOD PRESSURE: 60 MMHG | SYSTOLIC BLOOD PRESSURE: 95 MMHG

## 2024-06-11 DIAGNOSIS — R50.9 FEVER, UNSPECIFIED: ICD-10-CM

## 2024-06-11 DIAGNOSIS — C14.0 MALIGNANT NEOPLASM OF PHARYNX, UNSPECIFIED: ICD-10-CM

## 2024-06-11 DIAGNOSIS — I10 ESSENTIAL (PRIMARY) HYPERTENSION: ICD-10-CM

## 2024-06-11 DIAGNOSIS — C03.9 MALIGNANT NEOPLASM OF GUM, UNSPECIFIED: ICD-10-CM

## 2024-06-11 DIAGNOSIS — M06.9 RHEUMATOID ARTHRITIS, UNSPECIFIED: ICD-10-CM

## 2024-06-11 DIAGNOSIS — H92.02 OTALGIA, LEFT EAR: ICD-10-CM

## 2024-06-11 LAB
ALBUMIN SERPL ELPH-MCNC: 3.6 G/DL
ALP BLD-CCNC: 54 U/L
ALT SERPL-CCNC: 13 U/L
ANION GAP SERPL CALC-SCNC: 12 MMOL/L
AST SERPL-CCNC: 15 U/L
BASOPHILS # BLD AUTO: 0.03 K/UL
BASOPHILS NFR BLD AUTO: 0.3 %
BILIRUB SERPL-MCNC: 0.2 MG/DL
BUN SERPL-MCNC: 16 MG/DL
CALCIUM SERPL-MCNC: 9 MG/DL
CHLORIDE SERPL-SCNC: 98 MMOL/L
CO2 SERPL-SCNC: 26 MMOL/L
CREAT SERPL-MCNC: 1.43 MG/DL
CRP SERPL-MCNC: 46 MG/L
EGFR: 51 ML/MIN/1.73M2
EOSINOPHIL # BLD AUTO: 0.21 K/UL
EOSINOPHIL NFR BLD AUTO: 1.9 %
ERYTHROCYTE [SEDIMENTATION RATE] IN BLOOD BY WESTERGREN METHOD: 85 MM/HR
GLUCOSE SERPL-MCNC: 97 MG/DL
HCT VFR BLD CALC: 36.5 %
HGB BLD-MCNC: 11.1 G/DL
IMM GRANULOCYTES NFR BLD AUTO: 0.5 %
INFLUENZA A RESULT: NOT DETECTED
INFLUENZA B RESULT: NOT DETECTED
LYMPHOCYTES # BLD AUTO: 1.45 K/UL
LYMPHOCYTES NFR BLD AUTO: 13.3 %
MAN DIFF?: NORMAL
MCHC RBC-ENTMCNC: 28.5 PG
MCHC RBC-ENTMCNC: 30.4 GM/DL
MCV RBC AUTO: 93.8 FL
MONOCYTES # BLD AUTO: 1.21 K/UL
MONOCYTES NFR BLD AUTO: 11.1 %
NEUTROPHILS # BLD AUTO: 7.98 K/UL
NEUTROPHILS NFR BLD AUTO: 72.9 %
PLATELET # BLD AUTO: 475 K/UL
POTASSIUM SERPL-SCNC: 5 MMOL/L
PROT SERPL-MCNC: 6.9 G/DL
RBC # BLD: 3.89 M/UL
RBC # FLD: 12.5 %
RESP SYN VIRUS RESULT: NOT DETECTED
SARS-COV-2 RESULT: NOT DETECTED
SODIUM SERPL-SCNC: 137 MMOL/L
WBC # FLD AUTO: 10.93 K/UL

## 2024-06-11 PROCEDURE — G0463: CPT

## 2024-06-11 PROCEDURE — 99214 OFFICE O/P EST MOD 30 MIN: CPT

## 2024-06-11 RX ORDER — FLUTICASONE PROPIONATE 50 UG/1
50 SPRAY, METERED NASAL DAILY
Qty: 1 | Refills: 1 | Status: ACTIVE | COMMUNITY
Start: 2024-06-11 | End: 1900-01-01

## 2024-06-11 RX ORDER — OFLOXACIN OTIC 3 MG/ML
0.3 SOLUTION AURICULAR (OTIC)
Qty: 1 | Refills: 0 | Status: ACTIVE | COMMUNITY
Start: 2024-06-11 | End: 1900-01-01

## 2024-06-11 NOTE — ASSESSMENT
[FreeTextEntry1] : Mr. TAMI RUSSO is a 73 year old Other race  male  with history of  memory loss, psoriatic arthritis, rheumatoid arthritis, hearing loss, urinary urgency presented today for left ear pain.   # fever with left ear pain Check COVID/flu/rsv, throat culture.  Sent rx for ear ABT drops and recommended Flonase, nasal saline spray.  Made referral to ENT if sx does not improve.  He will see his oncologist this week.   RTO as needed.

## 2024-06-11 NOTE — HISTORY OF PRESENT ILLNESS
[FreeTextEntry8] : Mr. TAMI RUSSO is a 73 year old Other race  male  with history of  memory loss, psoriatic arthritis, rheumatoid arthritis, hearing loss, urinary urgency presented today for left ear pain.  Last seen by me 5/24/24 for CPE. Daughter came together.   4 days ago he felt chills after working at the [employer]. He runs a handsomexcutive business. He developed temp 100.1, 101, 100.7F 3 days in a row. From yesterday left ear pain on top of chronic tinnitus, hearing difficulty. +Nasal voice. Denied cough, runny nose, throat pain, neck lymph node pain. Denied exposure to sick person. Denied GI issues. He tried [employer] hearing aids for 1 week, but stopped using it as it was not comfortable. Will see oncologist at [facility] in 3 days. Denied CP, SOB, abdominal pain, n/v/c/d.

## 2024-06-11 NOTE — PHYSICAL EXAM
[Normal Outer Ear/Nose] : the outer ears and nose were normal in appearance [Normal Oropharynx] : the oropharynx was normal [Normal TMs] : both tympanic membranes were normal [No Lymphadenopathy] : no lymphadenopathy [de-identified] : WDWN in NAD HEENT:  unremarkable Neck:  supple, no JVD, no LN Lungs:  CTA B/L, no W/R/R Heart:  Reg rate, +S1S2, no M/R/G Abdomen:  soft, NT, ND, +BS, no masses, no HS-megaly Genital: No pubic or genital lesions noted. Ext:  no C/C/E Neuro:  no focal deficits

## 2024-06-11 NOTE — INTERPRETER SERVICES
[Patient Declined  Services] : - None: Patient declined  services [Interpreters_Relationshiptopatient] : daughter [TWNoteComboBox1] : Gu[place]ian

## 2024-06-11 NOTE — REVIEW OF SYSTEMS
[FreeTextEntry2] : Constitutional:  no fever and no chills.  Eyes:  no discharge.  HEENT:  no earache.  Cardiovascular:  no chest pain, no palpitations and no lower extremity edema.  Respiratory:  no shortness of breath, no wheezing and no cough.  Gastrointestinal:  no abdominal pain, no nausea and no vomiting.  Genitourinary:  no dysuria.  Musculoskeletal:  no joint pain.  Integumentary:  no itching.  Neurological:  no headache.  Psychiatric:  not suicidal.  Hematologic/Lymphatic:  no easy bleeding. [FreeTextEntry4] : see hpi

## 2024-06-12 LAB — BACTERIA THROAT CULT: NORMAL

## 2024-06-13 ENCOUNTER — TRANSCRIPTION ENCOUNTER (OUTPATIENT)
Age: 74
End: 2024-06-13

## 2024-06-16 PROBLEM — Z13.39 SCREENING FOR ALCOHOLISM: Status: ACTIVE | Noted: 2024-06-16

## 2024-06-16 PROBLEM — Z13.31 SCREENING FOR DEPRESSION: Status: ACTIVE | Noted: 2024-06-16

## 2024-06-16 NOTE — HEALTH RISK ASSESSMENT
[Good] : ~his/her~  mood as  good [0] : 2) Feeling down, depressed, or hopeless: Not at all (0) [PHQ-2 Negative - No further assessment needed] : PHQ-2 Negative - No further assessment needed [Never] : Never [With Family] : lives with family [Employed] : employed [Retired] : retired [] :  [Feels Safe at Home] : Feels safe at home [Fully functional (bathing, dressing, toileting, transferring, walking, feeding)] : Fully functional (bathing, dressing, toileting, transferring, walking, feeding) [Fully functional (using the telephone, shopping, preparing meals, housekeeping, doing laundry, using] : Fully functional and needs no help or supervision to perform IADLs (using the telephone, shopping, preparing meals, housekeeping, doing laundry, using transportation, managing medications and managing finances) [Smoke Detector] : smoke detector [Carbon Monoxide Detector] : carbon monoxide detector [Seat Belt] :  uses seat belt [No] : In the past 12 months have you used drugs other than those required for medical reasons? No [No falls in past year] : Patient reported no falls in the past year [Audit-CScore] : 0 [de-identified] : see hpi [de-identified] : active when in DR but less active in states [de-identified] : eating healty meals- sometimes salami which is higher in salt [[id number]] : 0 [Change in mental status noted] : No change in mental status noted [Language] : denies difficulty with language [None] : None [# Of Children ___] : has [unfilled] children [Reports changes in hearing] : Reports no changes in hearing [Reports changes in vision] : Reports no changes in vision [Reports changes in dental health] : Reports no changes in dental health [Guns at Home] : no guns at home [Sunscreen] : does not use sunscreen [ColonoscopyDate] : 10/20/2018 [ColonoscopyComments] : f/u [facility] for colon ca [de-identified] : wife, MIL, 2 of brothers and sisters. [de-identified] : since radiation has hearing loss [de-identified] : educated on sunscreen use [With Patient/Caregiver] : , with patient/caregiver [AdvancecareDate] : 05/24

## 2024-06-16 NOTE — INTERPRETER SERVICES
[Patient Declined  Services] : - None: Patient declined  services [Interpreters_Relationshiptopatient] : daughter [TWNoteComboBox1] : Dominican

## 2024-06-16 NOTE — HISTORY OF PRESENT ILLNESS
[Family Member] : family member [FreeTextEntry1] : Patient presents today for annual physical exam\par   [de-identified] : Mr. TAMI RUSSO is a 73 year old Unknown  male  with history of  memory loss, psoriatic arthritis, rheumatoid arthritis, hearing loss, urinary urgency presented today for comprehensive evaluation. Last seen by Dr. Nava 3/27/23.  Daughter came together and pt wishes use her as . He lives with wife, son and daughter.  Pt endorses that he survived 3 different cancers and all cancer care was by [facility].  He sees Janet Little MD - [facility] Head and Neck Surgeon Dr. Melendez, surgery [facility] )[phone] 1. Remote hx of left throat cancer s/o CTX, RTX.  2. Colon cancer in 2009 s/p colectomy 3. 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 No chemo, radiation tx is recommended at this time as CT scan result compatible with remission.   He reports Remote hx of b/l knee replacement. RA involved hands and toes. Hands noted with ulnar deviation. Did not get any rheumatoid medication in the past. Denies increased joint pain, stiffness.   He used to drink a lot but now does not drink EtOH. s/p Barium swallow test and told his esophagus muscle had no issues. [facility] referred him to local GI provider for long term management. He can drink thin water, Ensure and takes chopped, soft diet. As per pt, s/p barium swallow test unremarkable at [facility] test. Still food reflux is severe.  He asks for foot doctor referral for bunion, hammer toe, onychomycosis of Rt big toe. Denied fever, chills,CP, SOB, abdominal pain, n/v/c/d.

## 2024-06-16 NOTE — ASSESSMENT
[FreeTextEntry1] : Mr. TAMI RUSSO is a 73 year old Unknown  male  with history of  memory loss, psoriatic arthritis, rheumatoid arthritis, hearing loss, urinary urgency presented today for comprehensive evaluation.  1. HCM -vaccinations: influenza , shingrix, pneumovax, tdap - will check with [facility] and obtain in pharmacy if not received yet. he is utd on flu vaccine and covid vaccine -colon cancer screening - last exam in 2019 was normal; following at [facility] will bring records next visit PSA neg 02/2021; normal exam today. urology referral for urinary urgency.   2. Colon tubulovillous adenoma -following at [facility] -they do not know when his last colonoscopy was.   3. History of tonsil cancer -cancer free since 2018, following at [facility] [place] [facility]  4. New left cheek cancer dx 9/11/23 by B[facility] As per pt, the surgery was successful and pt reports feeling fine. F/u [facility] for regular CAT scan.  Denied unintentional weight loss, appetite issues.  Left buccal graft appears well healed, no ulcer, bleeding or pain. They were told it's not cancer related issue and [facility] returned him to general GI provider.  -Made referral to [facility] for swallowing difficult, food regurgitation.  5. Ulnar deviation; psoriatic lesions -fingers thickened/dactylitis with ulnar deviation Pt does not wish to see rheumatology.  Fall prevention discussed.  -check lab as planned. -F/up in 6months or prn.

## 2024-06-18 DIAGNOSIS — C03.9 MALIGNANT NEOPLASM OF GUM, UNSPECIFIED: ICD-10-CM

## 2024-06-18 DIAGNOSIS — M06.9 RHEUMATOID ARTHRITIS, UNSPECIFIED: ICD-10-CM

## 2024-06-18 DIAGNOSIS — C14.0 MALIGNANT NEOPLASM OF PHARYNX, UNSPECIFIED: ICD-10-CM

## 2024-06-18 DIAGNOSIS — H92.02 OTALGIA, LEFT EAR: ICD-10-CM

## 2024-06-18 DIAGNOSIS — R50.9 FEVER, UNSPECIFIED: ICD-10-CM

## 2024-07-08 ENCOUNTER — APPOINTMENT (OUTPATIENT)
Dept: GASTROENTEROLOGY | Facility: CLINIC | Age: 74
End: 2024-07-08
Payer: MEDICARE

## 2024-07-08 VITALS
RESPIRATION RATE: 16 BRPM | HEIGHT: 67.5 IN | BODY MASS INDEX: 18.93 KG/M2 | HEART RATE: 91 BPM | WEIGHT: 122 LBS | OXYGEN SATURATION: 97 % | TEMPERATURE: 98.5 F | DIASTOLIC BLOOD PRESSURE: 56 MMHG | SYSTOLIC BLOOD PRESSURE: 91 MMHG

## 2024-07-08 DIAGNOSIS — R13.10 DYSPHAGIA, UNSPECIFIED: ICD-10-CM

## 2024-07-08 DIAGNOSIS — R13.19 OTHER DYSPHAGIA: ICD-10-CM

## 2024-07-08 DIAGNOSIS — Z85.818 PERSONAL HISTORY OF MALIGNANT NEOPLASM OF OTHER SITES OF LIP, ORAL CAVITY, AND PHARYNX: ICD-10-CM

## 2024-07-08 PROCEDURE — 99204 OFFICE O/P NEW MOD 45 MIN: CPT

## 2024-07-30 ENCOUNTER — APPOINTMENT (OUTPATIENT)
Dept: GASTROENTEROLOGY | Facility: HOSPITAL | Age: 74
End: 2024-07-30

## 2024-07-30 ENCOUNTER — APPOINTMENT (OUTPATIENT)
Dept: GASTROENTEROLOGY | Facility: AMBULATORY MEDICAL SERVICES | Age: 74
End: 2024-07-30
Payer: MEDICARE

## 2024-07-30 DIAGNOSIS — K22.89 OTHER SPECIFIED DISEASE OF ESOPHAGUS: ICD-10-CM

## 2024-07-30 PROCEDURE — 43239 EGD BIOPSY SINGLE/MULTIPLE: CPT
